# Patient Record
Sex: FEMALE | Race: WHITE | NOT HISPANIC OR LATINO | Employment: FULL TIME | URBAN - METROPOLITAN AREA
[De-identification: names, ages, dates, MRNs, and addresses within clinical notes are randomized per-mention and may not be internally consistent; named-entity substitution may affect disease eponyms.]

---

## 2020-10-07 ENCOUNTER — VIRTUAL VISIT (OUTPATIENT)
Dept: FAMILY MEDICINE | Facility: OTHER | Age: 50
End: 2020-10-07
Payer: COMMERCIAL

## 2020-10-07 DIAGNOSIS — Z20.822 SUSPECTED 2019 NOVEL CORONAVIRUS INFECTION: Primary | ICD-10-CM

## 2020-10-07 PROCEDURE — 99421 OL DIG E/M SVC 5-10 MIN: CPT | Performed by: NURSE PRACTITIONER

## 2020-10-07 NOTE — PROGRESS NOTES
"Date: 10/07/2020 03:21:49  Clinician: Shirley Leon  Clinician NPI: [id number]  Patient: Shirley Rankin  Patient : 1970  Patient Address: [address] [place] [id number]  Patient Phone: [phone]  Visit Protocol: URI  Patient Summary:  Shirley is a 49 year old ( : 1970 ) female who initiated a [facility] Visit for COVID-19 (Coronavirus) evaluation and screening. When asked the question \"Please sign me up to receive news, health information and promotions from [facility].\", Shirley responded \"Yes\".    Shirley states her symptoms started suddenly 7-9 days ago.   Her symptoms consist of a headache, a cough, malaise, and diarrhea.   Symptom details     Cough: Shirley coughs a few times an hour and her cough is not more bothersome at night. Phlegm does not come into her throat when she coughs. She does not believe her cough is caused by post-nasal drip.     Headache: She states the headache is mild (1-3 on a 10 point pain scale).      Shirley denies having ear pain, wheezing, fever, nasal congestion, anosmia, vomiting, rhinitis, nausea, facial pain or pressure, myalgias, chills, sore throat, teeth pain, and ageusia. She also denies double sickening (worsening symptoms after initial improvement), taking antibiotic medication in the past month, and having recent facial or sinus surgery in the past 60 days. She is not experiencing dyspnea.   Precipitating events  She has not recently been exposed to someone with influenza. Shirley has been in close contact with the following high risk individuals: adults 65 or older.   Pertinent COVID-19 (Coronavirus) information  In the past 14 days, Shirley has not worked in a congregate living setting.   She either works or volunteers as a healthcare worker or a , or works or volunteers in a healthcare facility. She does not provide direct patient care. 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 She does not live with a healthcare worker.   Shirley has not had a close contact with a laboratory-confirmed COVID-19 patient within 14 days of symptom onset.   Since December 2019, Shirley and has had upper respiratory infection (URI) or influenza-like illness. Has not been diagnosed with lab-confirmed COVID-19 test      Date(s) of previous URI or influenza-like illness (free-text): May 6-May 9, 2020; [id number], 2020     Symptoms Shirley experienced during previous URI or influenza-like illness as reported by the patient (free-text): May: fever, muscle aches, exhaustion/weakness; September: fever, muscle aches, cramping, diarrhea, cough        Pertinent medical history  Shirley does not get yeast infections when she takes antibiotics.   Shirley needs a return to work/school note.   Weight: 185 lbs   Shirley smokes or uses smokeless tobacco.   She denies pregnancy and denies breastfeeding. She does not menstruate.   Additional information as reported by the patient (free text): Primary concern latest illness; I need to be COVID tested in order to return to employer. Fever began on 9/30 @ 9 PM...took 400mg ibuprofen PO &amp; left work-unknown T? Progressive 'exhaustion/muscle pain[similar to Mononucleosis] GI cramping AM 10/1--temperature was 100.3; diarrhea 10/3 to present. Bed ridden until Sunday AM 10/4, only able to sleep, drink water and take ibuprofen q-8 hrs. Had chicken noodle soup on 10/3 (first food) and fever broke on 10/4; still having diarrhea &amp; cough 10/6.   Weight: 185 lbs    MEDICATIONS: duloxetine oral, bupropion HCl oral, ALLERGIES: NKDA  Clinician Response:  Dear Shirley,   Your symptoms show that you may have coronavirus (COVID-19). This illness can cause fever, cough and trouble breathing. Many people get a mild case and get better on their own. Some people can get very  "sick.  What should I do?  We would like to test you for this virus.   1. Please call [phone] to schedule your visit. Explain that you were [facility] by [facility] to have a COVID-19 test. Be ready to share your [facility] number.  The following will serve as your written order for this COVID Test, ordered by me, for the indication of suspected COVID [Z20.828]: The test will be ordered in [employer], our electronic health record, after you are scheduled. It will show as ordered and authorized by Darryl Ram MD.  Order: COVID-19 (Coronavirus) PCR for SYMPTOMATIC testing from [facility].      2. When it's time for your COVID test:  Stay at least 6 feet away from others. (If someone will drive you to your test, stay in the backseat, as far away from the  as you can.)   Cover your mouth and nose with a mask, tissue or washcloth.  Go straight to the testing site. Don't make any stops on the way there or back.      3.Starting now: Stay home and away from others (self-isolate) until:   You've had no fever---and no medicine that reduces fever---for one full day (24 hours). And...   Your other symptoms have gotten better. For example, your cough or breathing has improved. And...   At least 10 days have passed since your symptoms started.       During this time, don't leave the house except for testing or medical care.   Stay in your own room, even for meals. Use your own bathroom if you can.   Stay away from others in your home. No hugging, kissing or shaking hands. No visitors.  Don't go to work, school or anywhere else.    Clean \"high touch\" surfaces often (doorknobs, counters, handles, etc.). Use a household cleaning spray or wipes. You'll find a full list of  on the EPA website: www.epa.gov/pesticide-registration/list-n-disinfectants-use-against-sars-cov-2.   Cover your mouth and nose with a mask, tissue or washcloth to avoid spreading germs.  Wash your hands and face often. Use soap and water.  Caregivers in " these groups are at risk for severe illness due to COVID-19:  o People 65 years and older  o People who live in a nursing home or long-term care facility  o People with chronic disease (lung, heart, cancer, diabetes, kidney, liver, immunologic)  o People who have a weakened immune system, including those who:   Are in cancer treatment  Take medicine that weakens the immune system, such as corticosteroids  Had a bone marrow or organ transplant  Have an immune deficiency  Have poorly controlled HIV or AIDS  Are obese (body mass index of 40 or higher)  Smoke regularly   o Caregivers should wear gloves while washing dishes, handling laundry and cleaning bedrooms and bathrooms.  o Use caution when washing and drying laundry: Don't shake dirty laundry, and use the warmest water setting that you can.  o For more tips, go to www.cdc.gov/coronavirus/2019-ncov/downloads/10Things.pdf.    4.Sign up for [employer]. We know it's scary to hear that you might have COVID-19. We want to track your symptoms to make sure you're okay over the next 2 weeks. Please look for an email from [employer]---this is a free, online program that we'll use to keep in touch. To sign up, follow the link in the email. Learn more at [email or web address]  How can I take care of myself?   Get lots of rest. Drink extra fluids (unless a doctor has told you not to).   Take Tylenol (acetaminophen) for fever or pain. If you have liver or kidney problems, ask your family doctor if it's okay to take Tylenol.   Adults can take either:    650 mg (two 325 mg pills) every 4 to 6 hours, or...   1,000 mg (two 500 mg pills) every 8 hours as needed.    Note: Don't take more than 3,000 mg in one day. Acetaminophen is found in many medicines (both prescribed and over-the-counter medicines). Read all labels to be sure you don't take too much.   For children, check the Tylenol bottle for the right dose. The dose is based on the child's age or weight.    If  you have other health problems (like cancer, heart failure, an organ transplant or severe kidney disease): Call your specialty clinic if you don't feel better in the next 2 days.       Know when to call 911. Emergency warning signs include:    Trouble breathing or shortness of breath Pain or pressure in the chest that doesn't go away Feeling confused like you haven't felt before, or not being able to wake up Bluish-colored lips or face.  Where can I get more information?   [facility] -- About COVID-19: [email or web address]   CDC -- What to Do If You're Sick: www.cdc.gov/coronavirus/2019-ncov/about/steps-when-sick.html   CDC -- Ending Home Isolation: www.cdc.gov/coronavirus/2019-ncov/hcp/disposition-in-home-patients.html   [facility] -- Caring for Someone: www.cdc.gov/coronavirus/2019-ncov/if-you-are-sick/care-for-someone.html   [facility] -- Interim Guidance for Hospital Discharge to Home: www.[facility].[place]./diseases/coronavirus/hcp/hospdischarge.pdf   [facility] clinical trials (COVID-19 research studies): clinicalaffairs.[facility]-clinical-trials    Below are the COVID-19 hotlines at the [place] [facility] ([facility]). Interpreters are available.    For health questions: Call 163-119-2732 or 1-584.575.1020 (7 a.m. to 7 p.m.) For questions about schools and childcare: Call 497-016-5040 or 1-241.569.9950 (7 a.m. to 7 p.m.)    Diagnosis: Cough  Diagnosis ICD: R05

## 2020-10-15 DIAGNOSIS — Z20.822 SUSPECTED 2019 NOVEL CORONAVIRUS INFECTION: ICD-10-CM

## 2022-04-13 ENCOUNTER — HOSPITAL ENCOUNTER (EMERGENCY)
Facility: CLINIC | Age: 52
Discharge: HOME | End: 2022-04-14
Attending: EMERGENCY MEDICINE | Admitting: EMERGENCY MEDICINE
Payer: COMMERCIAL

## 2022-04-13 ENCOUNTER — APPOINTMENT (OUTPATIENT)
Dept: GENERAL RADIOLOGY | Facility: CLINIC | Age: 52
End: 2022-04-13
Attending: EMERGENCY MEDICINE
Payer: COMMERCIAL
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
## 2022-04-13 DIAGNOSIS — L03.114 CELLULITIS OF LEFT HAND: ICD-10-CM

## 2022-04-13 DIAGNOSIS — L03.114 CELLULITIS OF LEFT UPPER EXTREMITY: ICD-10-CM

## 2022-04-13 DIAGNOSIS — S61.452A CAT BITE OF HAND, LEFT, INITIAL ENCOUNTER: ICD-10-CM

## 2022-04-13 PROCEDURE — 99284 EMERGENCY DEPT VISIT MOD MDM: CPT | Mod: 25

## 2022-04-13 PROCEDURE — 250N000011 HC RX IP 250 OP 636: Performed by: EMERGENCY MEDICINE

## 2022-04-13 PROCEDURE — 96365 THER/PROPH/DIAG IV INF INIT: CPT

## 2022-04-13 PROCEDURE — 73130 X-RAY EXAM OF HAND: CPT | Mod: LT

## 2022-04-13 PROCEDURE — 96367 TX/PROPH/DG ADDL SEQ IV INF: CPT

## 2022-04-13 RX ORDER — METRONIDAZOLE 500 MG/100ML
500 INJECTION, SOLUTION INTRAVENOUS ONCE
Status: COMPLETED | OUTPATIENT
Start: 2022-04-13 | End: 2022-04-14

## 2022-04-13 RX ORDER — CEFTRIAXONE 1 G/1
1 INJECTION, POWDER, FOR SOLUTION INTRAMUSCULAR; INTRAVENOUS ONCE
Status: COMPLETED | OUTPATIENT
Start: 2022-04-13 | End: 2022-04-13

## 2022-04-13 RX ADMIN — METRONIDAZOLE 500 MG: 500 INJECTION, SOLUTION INTRAVENOUS at 22:59

## 2022-04-13 RX ADMIN — CEFTRIAXONE 1 G: 1 INJECTION, POWDER, FOR SOLUTION INTRAMUSCULAR; INTRAVENOUS at 22:39

## 2022-04-13 ASSESSMENT — ENCOUNTER SYMPTOMS
FEVER: 0
CHILLS: 0
WOUND: 1

## 2022-04-14 VITALS
DIASTOLIC BLOOD PRESSURE: 101 MMHG | HEART RATE: 82 BPM | RESPIRATION RATE: 16 BRPM | SYSTOLIC BLOOD PRESSURE: 145 MMHG | BODY MASS INDEX: 31.6 KG/M2 | TEMPERATURE: 98 F | OXYGEN SATURATION: 97 % | WEIGHT: 184.08 LBS

## 2022-04-14 ASSESSMENT — ENCOUNTER SYMPTOMS: MYALGIAS: 1

## 2022-04-14 NOTE — ED TRIAGE NOTES
Pt is a  at [employer]. Last Tuesday pt personal cat bit her L hand. Pt L hand edema +3, redness, and pain. ABC in tact. A/OX4. Afebrile. Pt cleaned it out with chlorhexidine per self.

## 2022-04-14 NOTE — ED PROVIDER NOTES
History   Chief Complaint:  Cat Bite       HPI   Shirley Rankin is a 51 year old right handed female with history of cellulitis who presents with cat bite. The patient works as a  and  and yesterday was bitten by a cat on her left hand. This cat is a show cat and is up to date on vaccinations. The patient has swelling, redness, and pain to her left hand. She took 800 mg of ibuprofen tonight at 2100 for her symptoms. Her tetanus is up to date. No fever or chills.      Review of Systems   Constitutional: Negative for chills and fever.   Musculoskeletal: Positive for myalgias (left hand).   Skin: Positive for rash and wound.   All other systems reviewed and are negative.      Allergies:  The patient does not have any allergies    Medications:  Wellbutrin  Duloxetine  Quetiapine  Ropinirole    Past Medical History:     Depression  Cellulitis  Tobacco use disorder  Herpes simplex vulvovaginitis  Migraine    Past Surgical History:    T&A  Lasik  Caut cervix    Family History:    Father: alcohol/drug abuse, cataract  Mother: hyperlipidemia  Brother: mental disorder    Social History:  Presents alone. Nonsmoker.    Physical Exam     Patient Vitals for the past 24 hrs:   BP Temp Temp src Pulse Resp SpO2 Weight   04/14/22 0008 -- -- -- -- -- 97 % --   04/14/22 0007 (!) 145/101 -- -- 82 -- -- --   04/13/22 2315 -- -- -- -- -- 97 % --   04/13/22 2300 (!) 137/108 -- -- 84 -- 95 % --   04/13/22 2245 -- -- -- -- -- 95 % --   04/13/22 2230 132/89 -- -- -- -- -- --   04/13/22 2203 (!) 157/116 98  F (36.7  C) Temporal 92 16 96 % 83.5 kg (184 lb 1.4 oz)       Physical Exam  General: Adult female sitting upright  CV: Left radial pulse intact.  Regular rate and rhythm  Resp: Normal respiratory effort.  MSK: Generalized edema of the left hand into the left forearm.  Mild generalized tenderness of the dorsum of the hand without fluctuance.  No crepitus.  Is able to range the digits of the left hand and at the left  wrist.    Skin: Erythema diffusely of the left hand into the left forearm.  Punctate scabbed lesions noted over the dorsal aspect of the left hand near the MCP joints of the second and third digits.  Neuro: Alert and oriented. Responds appropriately to all questions and commands. No focal findings appreciated. Normal muscle tone.  Sensation intact to light touch over all dermatomes of the distal left upper extremity.  Psych: Normal mood and affect. Pleasant.    Lymph: No palpable axillary lymphadenopathy.      Emergency Department Course     Imaging:  XR Hand Left G/E 3 Views   Final Result   IMPRESSION: Mild soft tissue swelling of the dorsal radial aspect of the hand. No acute fracture dislocation. No evidence of radiopaque foreign body or osteomyelitis.        Report per radiology      Emergency Department Course:     Reviewed:  I reviewed nursing notes, vitals, past medical history and Care Everywhere    Assessments:  2223 I obtained history and examined the patient as noted above.    2335 I rechecked the patient and explained findings. She denies any new concerns. Feels comfortable with the plan.     Interventions:  2239 Rocephin 1 g IV    2259 Flagyl 500 mg IV    Disposition:  The patient was discharged to home.     Impression & Plan     Medical Decision Making:    Shirley Rankin is a 51 year old right-handed female who presents for evaluation of a cat bite to the left hand days ago.  The workup here in the ED shows no signs of compartment syndrome, significant lacerations, tendon or bone injury.  No signs of foreign body or cat teeth in wound. No foreign body found on xray. Cat is known and is up to date on immunizations.  IV Flagyl and ceftriaxone given here in the ED. Will start augmentin and have them observe for signs of infection (pain, redness, warmth, red streaks, etc).  Splint placed on left wrist.  Patient recommended follow-up with the PCP within 2 days.  Return to the ED with worsening all  questions answered prior to discharge.        Diagnosis:    ICD-10-CM    1. Cat bite of hand, left, initial encounter  S61.452A     W55.01XA    2. Cellulitis of left upper extremity  L03.114    3. Cellulitis of left hand  L03.114        Discharge Medications:  New Prescriptions    AMOXICILLIN-CLAVULANATE (AUGMENTIN) 875-125 MG TABLET    Take 1 tablet by mouth 2 times daily for 10 days       Scribe Disclosure:  I, Mathew Baltazar, am serving as a scribe at 10:22 PM on 4/13/2022 to document services personally performed by Mariam Glass MD based on my observations and the provider's statements to me.         Mariam Glass MD  04/14/22 0323

## 2022-04-14 NOTE — DISCHARGE INSTRUCTIONS
Discharge Instructions  Cellulitis    Cellulitis is an infection of the skin that occurs when bacteria enter the skin.   Symptoms are generally redness, swelling, warmth and pain.  Your infection appeared to be appropriate to treat at home with antibiotics.  However, sometimes your infection may be worse than it seemed at first, or may worsen with time. If you have new or worse symptoms, you may need to be seen again in the Emergency Department or by your primary provider.    Generally, every Emergency Department visit should have a follow-up clinic visit with either a primary or a specialty clinic/provider. Please follow-up as instructed by your emergency provider today.    Return to the Emergency Department if:  The redness, pain, or swelling gets a lot worse.  If the red area was marked, return if it is red significantly beyond the marked area.  You are unable to get your antibiotics, or are vomiting (throwing up) these pills, or you cannot take them.  You are feeling more ill, weak or lightheaded.  You start to run a new fever (temperature >101 F).  Anything else about the infection worries or concerns you.  Treatment:  Start your antibiotics right away, and take them as prescribed. Be sure to finish the whole prescription, even if you are better.  Apply a heating pad, warm packs, or warm water soaks to the infected area for 15 minutes at a time, at least 3 times a day. Do not use a heating pad on your feet or legs if you have diabetes. Do not sleep with a heating pad on, since this can cause burns or skin injury.  Rest your injured area for at least 1-2 days. After that you may start using your extremity again as long as there is not too much pain.   Raise the injured area above the level of your heart as much as possible in the first 1-2 days.  Tylenol  (acetaminophen), Motrin  (ibuprofen), or Advil  (ibuprofen) may help may help reduce pain and fever and may help you feel more comfortable. Be sure to read and  follow the package directions, and ask your provider if you have questions.    If you were given a prescription for medicine here today, be sure to read all of the information (including the package insert) that comes with your prescription.  This will include important information about the medicine, its side effects, and any warnings that you need to know about.  The pharmacist who fills the prescription can provide more information and answer questions you may have about the medicine.  If you have questions or concerns that the pharmacist cannot address, please call or return to the Emergency Department.     Remember that you can always come back to the Emergency Department if you are not able to see your regular provider in the amount of time listed above, if you get any new symptoms, or if there is anything that worries you.

## 2022-07-30 ENCOUNTER — HEALTH MAINTENANCE LETTER (OUTPATIENT)
Age: 52
End: 2022-07-30

## 2022-10-09 ENCOUNTER — HEALTH MAINTENANCE LETTER (OUTPATIENT)
Age: 52
End: 2022-10-09

## 2023-08-19 ENCOUNTER — HEALTH MAINTENANCE LETTER (OUTPATIENT)
Age: 53
End: 2023-08-19

## 2024-10-12 ENCOUNTER — HEALTH MAINTENANCE LETTER (OUTPATIENT)
Age: 54
End: 2024-10-12